# Patient Record
Sex: FEMALE | Race: WHITE | NOT HISPANIC OR LATINO | Employment: FULL TIME | URBAN - METROPOLITAN AREA
[De-identification: names, ages, dates, MRNs, and addresses within clinical notes are randomized per-mention and may not be internally consistent; named-entity substitution may affect disease eponyms.]

---

## 2023-07-11 ENCOUNTER — HOSPITAL ENCOUNTER (EMERGENCY)
Facility: CLINIC | Age: 28
Discharge: HOME | End: 2023-07-11

## 2023-07-11 ENCOUNTER — HOSPITAL ENCOUNTER (INPATIENT)
Facility: CLINIC | Age: 28
LOS: 1 days | Discharge: HOME | DRG: 776 | End: 2023-07-12
Attending: OBSTETRICS & GYNECOLOGY | Admitting: OBSTETRICS & GYNECOLOGY
Payer: COMMERCIAL

## 2023-07-11 VITALS
OXYGEN SATURATION: 100 % | HEART RATE: 106 BPM | TEMPERATURE: 98.6 F | RESPIRATION RATE: 18 BRPM | SYSTOLIC BLOOD PRESSURE: 156 MMHG | DIASTOLIC BLOOD PRESSURE: 94 MMHG

## 2023-07-11 PROBLEM — O14.90 PREECLAMPSIA: Status: ACTIVE | Noted: 2023-07-11

## 2023-07-11 LAB
ALBUMIN SERPL BCG-MCNC: 3.8 G/DL (ref 3.5–5.2)
ALP SERPL-CCNC: 153 U/L (ref 35–104)
ALT SERPL W P-5'-P-CCNC: 161 U/L (ref 0–50)
ANION GAP SERPL CALCULATED.3IONS-SCNC: 15 MMOL/L (ref 7–15)
AST SERPL W P-5'-P-CCNC: 77 U/L (ref 0–45)
BILIRUB SERPL-MCNC: 0.2 MG/DL
BUN SERPL-MCNC: 12.5 MG/DL (ref 6–20)
CALCIUM SERPL-MCNC: 9.1 MG/DL (ref 8.6–10)
CHLORIDE SERPL-SCNC: 98 MMOL/L (ref 98–107)
CREAT SERPL-MCNC: 0.62 MG/DL (ref 0.51–0.95)
DEPRECATED HCO3 PLAS-SCNC: 22 MMOL/L (ref 22–29)
ERYTHROCYTE [DISTWIDTH] IN BLOOD BY AUTOMATED COUNT: 14.8 % (ref 10–15)
GFR SERPL CREATININE-BSD FRML MDRD: >90 ML/MIN/1.73M2
GLUCOSE SERPL-MCNC: 106 MG/DL (ref 70–99)
HCT VFR BLD AUTO: 33.2 % (ref 35–47)
HGB BLD-MCNC: 10.6 G/DL (ref 11.7–15.7)
MCH RBC QN AUTO: 27.5 PG (ref 26.5–33)
MCHC RBC AUTO-ENTMCNC: 31.9 G/DL (ref 31.5–36.5)
MCV RBC AUTO: 86 FL (ref 78–100)
PLATELET # BLD AUTO: 365 10E3/UL (ref 150–450)
POTASSIUM SERPL-SCNC: 4.4 MMOL/L (ref 3.4–5.3)
PROT SERPL-MCNC: 6.9 G/DL (ref 6.4–8.3)
RBC # BLD AUTO: 3.86 10E6/UL (ref 3.8–5.2)
SODIUM SERPL-SCNC: 135 MMOL/L (ref 136–145)
WBC # BLD AUTO: 13.4 10E3/UL (ref 4–11)

## 2023-07-11 PROCEDURE — 36415 COLL VENOUS BLD VENIPUNCTURE: CPT | Performed by: OBSTETRICS & GYNECOLOGY

## 2023-07-11 PROCEDURE — 80053 COMPREHEN METABOLIC PANEL: CPT | Performed by: OBSTETRICS & GYNECOLOGY

## 2023-07-11 PROCEDURE — 85027 COMPLETE CBC AUTOMATED: CPT | Performed by: OBSTETRICS & GYNECOLOGY

## 2023-07-11 PROCEDURE — 120N000001 HC R&B MED SURG/OB

## 2023-07-11 PROCEDURE — G0463 HOSPITAL OUTPT CLINIC VISIT: HCPCS

## 2023-07-11 PROCEDURE — 250N000013 HC RX MED GY IP 250 OP 250 PS 637: Performed by: OBSTETRICS & GYNECOLOGY

## 2023-07-11 RX ORDER — ACETAMINOPHEN 325 MG/1
650 TABLET ORAL EVERY 4 HOURS PRN
Status: DISCONTINUED | OUTPATIENT
Start: 2023-07-11 | End: 2023-07-12 | Stop reason: HOSPADM

## 2023-07-11 RX ORDER — METHYLERGONOVINE MALEATE 0.2 MG/ML
200 INJECTION INTRAVENOUS
Status: DISCONTINUED | OUTPATIENT
Start: 2023-07-11 | End: 2023-07-12 | Stop reason: HOSPADM

## 2023-07-11 RX ORDER — HYDROCHLOROTHIAZIDE 25 MG/1
25 TABLET ORAL DAILY
Status: DISCONTINUED | OUTPATIENT
Start: 2023-07-12 | End: 2023-07-12 | Stop reason: HOSPADM

## 2023-07-11 RX ORDER — HYDROCHLOROTHIAZIDE 12.5 MG/1
25 TABLET ORAL DAILY
COMMUNITY

## 2023-07-11 RX ORDER — NIFEDIPINE 30 MG/1
30 TABLET, EXTENDED RELEASE ORAL DAILY
COMMUNITY

## 2023-07-11 RX ORDER — MISOPROSTOL 200 UG/1
800 TABLET ORAL
Status: DISCONTINUED | OUTPATIENT
Start: 2023-07-11 | End: 2023-07-12 | Stop reason: HOSPADM

## 2023-07-11 RX ORDER — OXYTOCIN/0.9 % SODIUM CHLORIDE 30/500 ML
340 PLASTIC BAG, INJECTION (ML) INTRAVENOUS CONTINUOUS PRN
Status: DISCONTINUED | OUTPATIENT
Start: 2023-07-11 | End: 2023-07-12 | Stop reason: HOSPADM

## 2023-07-11 RX ORDER — IBUPROFEN 800 MG/1
800 TABLET, FILM COATED ORAL EVERY 6 HOURS PRN
Status: DISCONTINUED | OUTPATIENT
Start: 2023-07-11 | End: 2023-07-12 | Stop reason: HOSPADM

## 2023-07-11 RX ORDER — TRANEXAMIC ACID 10 MG/ML
1 INJECTION, SOLUTION INTRAVENOUS EVERY 30 MIN PRN
Status: DISCONTINUED | OUTPATIENT
Start: 2023-07-11 | End: 2023-07-12 | Stop reason: HOSPADM

## 2023-07-11 RX ORDER — CARBOPROST TROMETHAMINE 250 UG/ML
250 INJECTION, SOLUTION INTRAMUSCULAR
Status: DISCONTINUED | OUTPATIENT
Start: 2023-07-11 | End: 2023-07-12 | Stop reason: HOSPADM

## 2023-07-11 RX ORDER — MISOPROSTOL 200 UG/1
400 TABLET ORAL
Status: DISCONTINUED | OUTPATIENT
Start: 2023-07-11 | End: 2023-07-12 | Stop reason: HOSPADM

## 2023-07-11 RX ORDER — MAGNESIUM HYDROXIDE/ALUMINUM HYDROXICE/SIMETHICONE 120; 1200; 1200 MG/30ML; MG/30ML; MG/30ML
30 SUSPENSION ORAL
Status: DISCONTINUED | OUTPATIENT
Start: 2023-07-11 | End: 2023-07-11 | Stop reason: HOSPADM

## 2023-07-11 RX ORDER — DOCUSATE SODIUM 100 MG/1
100 CAPSULE, LIQUID FILLED ORAL DAILY
Status: DISCONTINUED | OUTPATIENT
Start: 2023-07-12 | End: 2023-07-12 | Stop reason: HOSPADM

## 2023-07-11 RX ORDER — NIFEDIPINE 30 MG/1
30 TABLET, EXTENDED RELEASE ORAL DAILY
Status: DISCONTINUED | OUTPATIENT
Start: 2023-07-12 | End: 2023-07-12 | Stop reason: HOSPADM

## 2023-07-11 RX ORDER — ESCITALOPRAM OXALATE 20 MG/1
20 TABLET ORAL DAILY
Status: DISCONTINUED | OUTPATIENT
Start: 2023-07-12 | End: 2023-07-12 | Stop reason: HOSPADM

## 2023-07-11 RX ORDER — ACETAMINOPHEN 325 MG/1
650 TABLET ORAL EVERY 4 HOURS PRN
Status: DISCONTINUED | OUTPATIENT
Start: 2023-07-11 | End: 2023-07-11 | Stop reason: HOSPADM

## 2023-07-11 RX ORDER — BISACODYL 10 MG
10 SUPPOSITORY, RECTAL RECTAL DAILY PRN
Status: DISCONTINUED | OUTPATIENT
Start: 2023-07-11 | End: 2023-07-12 | Stop reason: HOSPADM

## 2023-07-11 RX ORDER — HYDROCORTISONE 25 MG/G
CREAM TOPICAL 3 TIMES DAILY PRN
Status: DISCONTINUED | OUTPATIENT
Start: 2023-07-11 | End: 2023-07-12 | Stop reason: HOSPADM

## 2023-07-11 RX ORDER — OXYTOCIN 10 [USP'U]/ML
10 INJECTION, SOLUTION INTRAMUSCULAR; INTRAVENOUS
Status: DISCONTINUED | OUTPATIENT
Start: 2023-07-11 | End: 2023-07-12 | Stop reason: HOSPADM

## 2023-07-11 RX ADMIN — IBUPROFEN 800 MG: 800 TABLET, FILM COATED ORAL at 23:58

## 2023-07-11 ASSESSMENT — ACTIVITIES OF DAILY LIVING (ADL)
ADLS_ACUITY_SCORE: 18
ADLS_ACUITY_SCORE: 18

## 2023-07-12 ENCOUNTER — HOSPITAL ENCOUNTER (INPATIENT)
Facility: CLINIC | Age: 28
End: 2023-07-12
Admitting: OBSTETRICS & GYNECOLOGY

## 2023-07-12 VITALS
BODY MASS INDEX: 38.98 KG/M2 | SYSTOLIC BLOOD PRESSURE: 120 MMHG | WEIGHT: 220 LBS | DIASTOLIC BLOOD PRESSURE: 69 MMHG | TEMPERATURE: 97.9 F | HEIGHT: 63 IN | RESPIRATION RATE: 16 BRPM

## 2023-07-12 LAB
ALBUMIN SERPL BCG-MCNC: 3.7 G/DL (ref 3.5–5.2)
ALP SERPL-CCNC: 153 U/L (ref 35–104)
ALT SERPL W P-5'-P-CCNC: 155 U/L (ref 0–50)
ANION GAP SERPL CALCULATED.3IONS-SCNC: 9 MMOL/L (ref 7–15)
AST SERPL W P-5'-P-CCNC: 67 U/L (ref 0–45)
BILIRUB SERPL-MCNC: 0.2 MG/DL
BUN SERPL-MCNC: 11.1 MG/DL (ref 6–20)
CALCIUM SERPL-MCNC: 9.2 MG/DL (ref 8.6–10)
CHLORIDE SERPL-SCNC: 104 MMOL/L (ref 98–107)
CREAT SERPL-MCNC: 0.62 MG/DL (ref 0.51–0.95)
DEPRECATED HCO3 PLAS-SCNC: 27 MMOL/L (ref 22–29)
ERYTHROCYTE [DISTWIDTH] IN BLOOD BY AUTOMATED COUNT: 14.6 % (ref 10–15)
GFR SERPL CREATININE-BSD FRML MDRD: >90 ML/MIN/1.73M2
GLUCOSE SERPL-MCNC: 94 MG/DL (ref 70–99)
HCT VFR BLD AUTO: 34.4 % (ref 35–47)
HGB BLD-MCNC: 10.9 G/DL (ref 11.7–15.7)
MCH RBC QN AUTO: 27.7 PG (ref 26.5–33)
MCHC RBC AUTO-ENTMCNC: 31.7 G/DL (ref 31.5–36.5)
MCV RBC AUTO: 88 FL (ref 78–100)
PLATELET # BLD AUTO: 361 10E3/UL (ref 150–450)
POTASSIUM SERPL-SCNC: 4.4 MMOL/L (ref 3.4–5.3)
PROT SERPL-MCNC: 6.8 G/DL (ref 6.4–8.3)
RBC # BLD AUTO: 3.93 10E6/UL (ref 3.8–5.2)
SODIUM SERPL-SCNC: 140 MMOL/L (ref 136–145)
WBC # BLD AUTO: 12.5 10E3/UL (ref 4–11)

## 2023-07-12 PROCEDURE — G0463 HOSPITAL OUTPT CLINIC VISIT: HCPCS

## 2023-07-12 PROCEDURE — 250N000013 HC RX MED GY IP 250 OP 250 PS 637: Performed by: OBSTETRICS & GYNECOLOGY

## 2023-07-12 PROCEDURE — 36415 COLL VENOUS BLD VENIPUNCTURE: CPT | Performed by: OBSTETRICS & GYNECOLOGY

## 2023-07-12 PROCEDURE — 80053 COMPREHEN METABOLIC PANEL: CPT | Performed by: OBSTETRICS & GYNECOLOGY

## 2023-07-12 PROCEDURE — 85027 COMPLETE CBC AUTOMATED: CPT | Performed by: OBSTETRICS & GYNECOLOGY

## 2023-07-12 RX ADMIN — IBUPROFEN 800 MG: 800 TABLET, FILM COATED ORAL at 06:23

## 2023-07-12 ASSESSMENT — ACTIVITIES OF DAILY LIVING (ADL)
ADLS_ACUITY_SCORE: 18

## 2023-07-12 NOTE — PLAN OF CARE
"Goal Outcome Evaluation:             Pt arrives from ED. Delivered 23  term at [facility] with \"Shannon C.\" CNM. OB group during pregnancy was  [facility]. Pt took aspirin 81mg during pregnancy- primip with no complications in pregnancy or delivery. Other med in pregnancy Lexapro. Pt is bottle feeding. ( baby boy-healthy, no concerns)    Post partum pt had borderline BP's in hospital, states her labs were all normal and denies she had any PreE symptoms other than some edema. Pt discharged home with RX for Nifedipine 30mg 1 daily and hydrochlorothiazide 25mg 1 daily. Today pt felt like she was having \"different\" abdominal pain than cramps, didn't feel well. Temp mild elevated and BP at home was 160/100. Pt then called nurse line and was sent in.    Pt denies PreE symptoms. Took Tylenol and Ibuprofen at 1700.           "

## 2023-07-12 NOTE — PROGRESS NOTES
[facility]   PROGRESS NOTE     S. Pt states she is doing well overall. States today she feels much better and no longer has RUQ pain. She would like to go home if possible . +FM    Vitals:    23 0200 23 0400 23 0615 23 0830   BP: 117/76 120/76 128/80 120/69   Resp:   16    Temp:   97.9  F (36.6  C)    TempSrc:   Oral    Weight:       Height:           Constitutional: Patient is alert.   HENT:   Mouth/Throat: Moist mucous membranes.  Eyes: EOM are normal.  Neck: normal ROM.   Cardiovascular: Warm and well perfused.   Pulmonary/Chest: Effort normal.   Abdominal: Non-distended. NTGR, postpartum uterus with proper involution  Musculoskeletal: Normal range of motion.   Neurological: Moving all extremities.    Skin: Skin is warm and dry.   Psychiatric: Normal affect.      LABS    Latest Reference Range & Units 06/17/15 20:24 23 20:40 23 06:18   Creatinine 0.51 - 0.95 mg/dL 0.58 0.62 0.62      Latest Reference Range & Units 23 20:40 23 06:18   ALT 0 - 50 U/L 161 (H) 155 (H)   AST 0 - 45 U/L 77 (H) 67 (H)   (H): Data is abnormally high   Latest Reference Range & Units 06/17/15 20:24 23 20:40 23 06:18   Hemoglobin 11.7 - 15.7 g/dL 14.0 10.6 (L) 10.9 (L)   (L): Data is abnormally low   Latest Reference Range & Units 06/17/15 20:24 23 20:40 23 06:18   Platelet Count 150 - 450 10e3/uL 241 365 361     A/P Ana Casanova is a 28 year old  at PP here with     RUQ/transiminitis  - BP's have remained in the normal range  - no antihypertensive medications needed  - ALT alone is elevated and now trending down. At this point since all other labs are wnl I do not believe this is meeting criteria for any sort of pre-eclamptic event, especially with her BP's being wnl.   - pt instructed to continue BP checks at home and to have 1 wk visit in clinic for follow up and lab work up at that point to make sure ALT is further trending down or normalizing    - all questions answered    Dispo: home, 1 wk clinic visit (labs + BP check)    Dr. Makenzie Spivey  [phone]

## 2023-07-12 NOTE — H&P
"  2023    Ana Casanova  [id number]            Postpartum OBS History & Physical      Ms. Casanova presented to triage ppd5 s/p  with [facility] CNMs. She had undergone IOL for ghtn, experienced  with second degree laceration. She was discharged to home on nifedipine xl 30mg and hydrochlorothiazide 25mg.    Today, she started to feel unwell. She describes an unusual discomfort in her RUQ. She feels her swelling is much better than yesterday. No HA/visual changes. Labs were ordered, and significant for AST/ALT of 77/161. She was found to be normotensive, in the 125/80 range.      Estimated body mass index is 38.97 kg/m  as calculated from the following:    Height as of this encounter: 1.6 m (5' 3\").    Weight as of this encounter: 99.8 kg (220 lb).       She is a 28 year old   Her OB history:   OB History    Para Term  AB Living   1 1 1 0 0 0   SAB IAB Ectopic Multiple Live Births   0 0 0 0 0      # Outcome Date GA Lbr Romulo/2nd Weight Sex Delivery Anes PTL Lv   1 Term 23 40w0d   M Vag-Spont               Past Medical History:   Diagnosis Date     Depressive disorder      Hypertension         No past surgical history on file.      No current outpatient medications on file.       Allergies: Patient has no known allergies.      REVIEW OF SYSTEMS:  NEUROLOGIC:  Negative  EYES:  Negative  ENT:  Negative  GI:  Negative  BREAST:  Negative  :  Negative  GYN:  Negative  CV:  Negative  PULMONARY:  Negative  MUSCULOSKELETAL:  Negative  PSYCH:  Negative        Social History     Socioeconomic History     Marital status: Single     Spouse name: Not on file     Number of children: Not on file     Years of education: Not on file     Highest education level: Not on file   Occupational History     Not on file   Tobacco Use     Smoking status: Never     Smokeless tobacco: Never   Substance and Sexual Activity     Alcohol use: Not Currently     Drug use: Never     Sexual activity: Not on file   Other " Topics Concern     Not on file   Social History Narrative     Not on file     Social Determinants of Health     Financial Resource Strain: Not on file   Food Insecurity: Not on file   Transportation Needs: Not on file   Physical Activity: Not on file   Stress: Not on file   Social Connections: Not on file   Intimate Partner Violence: Not on file   Housing Stability: Not on file      No family history on file.      Vitals: VSS/AF     Alert Awake in NAD  HEENT grossly normal  Neck: no lymphadenopathy or thryoidomegaly  Lungs CTAB  Back no spinal or CVAT  Heart RRR  ABD gravid, nontender on exam  EXT:  +2 edema or calf tenderness  Neuro:  Grossly intact, 2/4 reflexes    Assessment/Plan: Ms. Ana Casanova is a 29 y/o  ppd#5 s/p , with known ghtn, admitted with RUQ pain and transaminitis    Postpartum preeclampsia  -BP labile, with 155/95 on arrival, but 125/80s after settling  -transaminitis, dull RUQ ache  -discussed the dx of preE, its effects and course. Reviewed preE vs eclampsia, and the role that magnesium can play. Discussed fluid shifting, and the role of normotension.  -repeat labs at 0600  -no magnesium at this time, but low threshold for starting, if evolving sx or increasing labs  -home nifedipine xl 30, hydrochlorothiazide 25mg ordered.    Emma Khanna MD  Dept of OB/GYN  2023

## 2023-07-12 NOTE — ED TRIAGE NOTES
Arrives from home. States she is 5 day post-par. States abdominal pain and low grade fever at home.     Denies headache or blurred vision.

## 2023-07-12 NOTE — PLAN OF CARE
Goal Outcome Evaluation:         2035 - Dr Clemencia espinosa from [facility] since pt was a [facility] patient. Reviewed hx , meds and first 2 BP's. Labs ordered

## 2023-07-12 NOTE — UTILIZATION REVIEW
Admission Status; Secondary Review Determination          (x) Observation status would be Appropriate- Short Stay- Post discharge review.     RATIONALE FOR DETERMINATION  28-year-old female postpartum day 5 who presented to the hospital feeling unwell with some right upper quadrant discomfort, moderate elevation in transaminases with initial blood pressure 156/94 that quickly normalized after patient settled into hospital unit.  Observation care appropriate to monitor for signs and symptoms of evolving preeclampsia.  Patient was admitted and discharge after one night stay. Record was sent by  for a PA review. Based on the  severity of illness, intensity of service provided, expected LOS and risk for adverse outcome make the care appropriate for further outpatient/observation; however, doesn't meet criteria for hospital inpatient admission.         The information on this document is developed by the utilization review team in order for the business office to ensure compliance.  This only denotes the appropriateness of proper admission status and does not reflect the quality of care rendered.         The definitions of Inpatient Status and Observation Status used in making the determination above are those provided in the CMS Coverage Manual, Chapter 1 and Chapter 6, section 70.4.      Sincerely,   Ignacio Escobar MD  Physician Advisor  [facility]

## 2023-07-12 NOTE — PLAN OF CARE
Pt denies any PreE symptoms. Reflexes hypoactive, no clonus, swelling decreased, BP WDL. MD at bedside. Labs reviewed. Orders to discharge pt to home. Will continue oral BP medications and monitor BPs BID. Follow up in clinic next week.

## 2023-07-14 ENCOUNTER — PATIENT OUTREACH (OUTPATIENT)
Dept: CARE COORDINATION | Facility: CLINIC | Age: 28
End: 2023-07-14
Payer: COMMERCIAL

## 2023-07-14 NOTE — PROGRESS NOTES
[facility]    Background: Transitional Care Management program identified per system criteria and reviewed by [facility] [place] team for possible outreach.    Assessment: Upon chart review, [facility] Team member will not proceed with patient outreach related to this episode of Transitional Care Management program due to reason below:    Patient has a follow up appointment with an appropriate provider today for hospital discharge.  Patient is just walking out the door for her follow up appt with [facility].    Plan: Transitional Care Management episode addressed appropriately per reason noted above.      Yanet Montoya MA  [facility] [place], [facility]    *Connected Care Resource Team does NOT follow patient ongoing. Referrals are identified based on internal discharge reports and the outreach is to ensure patient has an understanding of their discharge instructions.

## 2023-07-22 NOTE — DISCHARGE SUMMARY
[facility] Discharge Summary    Ana Casanova MRN# [id number]   Age: 28 year old YOB: 1995     Date of Admission:  2023  Date of Discharge::  2023   Admitting Physician:  Dr. Khanna  Discharge Physician:  Noemi Spivey MD     Home clinic: [facility]          Admission Diagnoses:   Indication for care in labor or delivery [O75.9]  Preeclampsia [O14.90]          Discharge Diagnosis:   Patient Active Problem List   Diagnosis     Indication for care in labor or delivery     Preeclampsia             Procedures:   Procedure(s):  none       No procedures performed during this admission           Medications Prior to Admission:     No medications prior to admission.             Discharge Medications:     Discharge Medication List as of 2023  8:32 AM      CONTINUE these medications which have NOT CHANGED    Details   Escitalopram Oxalate (LEXAPRO PO) Take 20 mg by mouth daily, Historical      hydrochlorothiazide (HYDRODIURIL) 12.5 MG tablet Take 25 mg by mouth daily, YUMIKO, Historical      NIFEdipine ER OSMOTIC (PROCARDIA XL) 30 MG 24 hr tablet Take 30 mg by mouth daily, Historical                   Consultations:   No consultations were requested during this admission          Brief History of Illness:   Ms. Casanova presented to triage ppd5 s/p  with  CNMs. She had undergone IOL for ghtn, experienced  with second degree laceration. She was discharged to home on nifedipine xl 30mg and hydrochlorothiazide 25mg.Pt was observed for transiminitis which was found to be stable and trending down on the day of her discharge.            Hospital Course:   The patient's hospital course was unremarkable. transminitis was found to be stable and trending down. Pt discharged home stable and with appropriate plan for BP monitoring and repeat labs as OP.           Discharge Instructions and Follow-Up:   Discharge diet: Regular   Discharge activity: Activity as tolerated   Discharge  follow-up: Follow up with primary care provider in 3 days   Wound care NA           Discharge Disposition:   Discharged to home      Attestation:  I have reviewed today's vital signs, notes, medications, labs and imaging.    Noemi Spivey MD